# Patient Record
Sex: FEMALE | Race: WHITE | NOT HISPANIC OR LATINO | Employment: FULL TIME | URBAN - METROPOLITAN AREA
[De-identification: names, ages, dates, MRNs, and addresses within clinical notes are randomized per-mention and may not be internally consistent; named-entity substitution may affect disease eponyms.]

---

## 2017-08-16 ENCOUNTER — CONVERSION ENCOUNTER (OUTPATIENT)
Dept: GENERAL RADIOLOGY | Facility: HOSPITAL | Age: 47
End: 2017-08-16

## 2017-08-31 ENCOUNTER — CONVERSION ENCOUNTER (OUTPATIENT)
Dept: GENERAL RADIOLOGY | Facility: HOSPITAL | Age: 47
End: 2017-08-31

## 2018-03-30 ENCOUNTER — CONVERSION ENCOUNTER (OUTPATIENT)
Dept: GENERAL RADIOLOGY | Facility: HOSPITAL | Age: 48
End: 2018-03-30

## 2019-06-21 ENCOUNTER — HOSPITAL ENCOUNTER (OUTPATIENT)
Dept: GENERAL RADIOLOGY | Facility: HOSPITAL | Age: 49
Discharge: HOME | End: 2019-06-21
Attending: OBSTETRICS & GYNECOLOGY

## 2019-07-01 ENCOUNTER — OFFICE VISIT CONVERTED (OUTPATIENT)
Dept: SURGERY | Facility: CLINIC | Age: 49
End: 2019-07-01
Attending: NURSE PRACTITIONER

## 2019-07-01 ENCOUNTER — CONVERSION ENCOUNTER (OUTPATIENT)
Dept: SURGERY | Facility: CLINIC | Age: 49
End: 2019-07-01

## 2019-07-05 ENCOUNTER — HOSPITAL ENCOUNTER (OUTPATIENT)
Dept: GASTROENTEROLOGY | Facility: HOSPITAL | Age: 49
Setting detail: HOSPITAL OUTPATIENT SURGERY
Discharge: HOME | End: 2019-07-05
Attending: SURGERY

## 2019-07-05 LAB — GLUCOSE BLD-MCNC: 109 MG/DL (ref 65–99)

## 2019-07-22 ENCOUNTER — OFFICE VISIT CONVERTED (OUTPATIENT)
Dept: SURGERY | Facility: CLINIC | Age: 49
End: 2019-07-22
Attending: NURSE PRACTITIONER

## 2020-06-23 ENCOUNTER — HOSPITAL ENCOUNTER (OUTPATIENT)
Dept: GENERAL RADIOLOGY | Facility: HOSPITAL | Age: 50
Discharge: HOME | End: 2020-06-23
Attending: INTERNAL MEDICINE

## 2020-06-29 ENCOUNTER — OFFICE VISIT CONVERTED (OUTPATIENT)
Dept: SURGERY | Facility: CLINIC | Age: 50
End: 2020-06-29
Attending: SURGERY

## 2020-07-13 ENCOUNTER — HOSPITAL ENCOUNTER (OUTPATIENT)
Dept: PREADMISSION TESTING | Facility: HOSPITAL | Age: 50
Discharge: HOME | End: 2020-07-13
Attending: SURGERY

## 2020-07-14 LAB — SARS-COV-2 RNA SPEC QL NAA+PROBE: NOT DETECTED

## 2020-07-15 ENCOUNTER — HOSPITAL ENCOUNTER (OUTPATIENT)
Dept: PERIOP | Facility: HOSPITAL | Age: 50
Setting detail: HOSPITAL OUTPATIENT SURGERY
Discharge: HOME | End: 2020-07-15
Attending: SURGERY

## 2020-07-15 LAB
ANION GAP SERPL CALC-SCNC: 14 MMOL/L (ref 8–19)
BUN SERPL-MCNC: 15 MG/DL (ref 5–25)
BUN/CREAT SERPL: 13 {RATIO} (ref 6–20)
CALCIUM SERPL-MCNC: 9.8 MG/DL (ref 8.7–10.4)
CHLORIDE SERPL-SCNC: 102 MMOL/L (ref 99–111)
CONV CO2: 26 MMOL/L (ref 22–32)
CREAT UR-MCNC: 1.17 MG/DL (ref 0.5–0.9)
GFR SERPLBLD BASED ON 1.73 SQ M-ARVRAT: 54 ML/MIN/{1.73_M2}
GLUCOSE SERPL-MCNC: 107 MG/DL (ref 65–99)
OSMOLALITY SERPL CALC.SUM OF ELEC: 287 MOSM/KG (ref 273–304)
POTASSIUM SERPL-SCNC: 4.4 MMOL/L (ref 3.5–5.3)
SODIUM SERPL-SCNC: 138 MMOL/L (ref 135–147)

## 2020-07-20 ENCOUNTER — OFFICE VISIT CONVERTED (OUTPATIENT)
Dept: SURGERY | Facility: CLINIC | Age: 50
End: 2020-07-20
Attending: SURGERY

## 2020-07-31 ENCOUNTER — OFFICE VISIT CONVERTED (OUTPATIENT)
Dept: SURGERY | Facility: CLINIC | Age: 50
End: 2020-07-31
Attending: SURGERY

## 2020-08-05 ENCOUNTER — HOSPITAL ENCOUNTER (OUTPATIENT)
Dept: GENERAL RADIOLOGY | Facility: HOSPITAL | Age: 50
Discharge: HOME | End: 2020-08-05
Attending: INTERNAL MEDICINE

## 2020-08-21 ENCOUNTER — OFFICE VISIT CONVERTED (OUTPATIENT)
Dept: SURGERY | Facility: CLINIC | Age: 50
End: 2020-08-21
Attending: SURGERY

## 2020-12-31 ENCOUNTER — HOSPITAL ENCOUNTER (OUTPATIENT)
Dept: GENERAL RADIOLOGY | Facility: HOSPITAL | Age: 50
Discharge: HOME | End: 2020-12-31
Attending: NURSE PRACTITIONER

## 2021-05-10 NOTE — H&P
History and Physical      Patient Name: Janet Hooker   Patient ID: [id number]   Sex: Female   YOB: 1970    Primary Care Provider: Candice Lou MD   Referring Provider: Candice Lou MD    Visit Date: June 29, 2020    Provider: Aristeo Ramachandran MD   Location: [facility]   Location Address: [address] [place] [address]  [id number]   Location Phone: [phone]          Chief Complaint  · Outpatient History & Physical / Surgical Orders  · Gallbladder Consult      History Of Present Illness     Janet came in today for evaluation. She is a really nice lady that we have met in the past. She has been having some upper abdominal pain after meals. She recently had an ultrasound that showed some gallstones.       Past Medical History  Allergic rhinitis, chronic; Anemia, Unspecified; Diabetes; Endometriosis of uterus; High blood pressure; High cholesterol; Hypertension, essential; Hypothyroidism; Polycystic Ovaries; Rectal bleeding         Past Surgical History  Cesarian Section; Hysterectomy-Abdominal; Septoplasty; Turbinate Reduction         Medication List  Advil oral; apple cider vinegar 600 mg oral capsule; B-12 Compliance 1,000 mcg/mL injection kit; carvedilol oral; Cinnamon 500 mg oral capsule; Claritin oral; Daily Fiber 0.52 gram oral capsule; Flonase Allergy Relief 50 mcg/actuation nasal spray,suspension; metformin oral; spironolactone oral; Synthroid 100 mcg Oral Tablet; Tylenol oral; Vitamin D-3 with Aloe 120-1,000-10 mg-unit-mg oral tablet; vitamin E 400 unit oral capsule         Allergy List  Codeine Phosphate         Family Medical History  Diabetes, unspecified type; Renal Calculus         Social History  *Denies Alcohol Use; Alcohol (Never); Caffeine (Current some day); Second hand smoke exposure (Never); Tobacco (Never)         Review of Systems  · Constitutional  o Denies  o : fever  · Eyes  o Denies  o : yellowish discoloration of eyes  · HENT  o Denies  o : difficulty  "swallowing  · Cardiovascular  o Denies  o : chest pain on exertion  · Respiratory  o Denies  o : shortness of breath  · Gastrointestinal  o Denies  o : nausea, vomiting, diarrhea, constipation  · Integument  o Denies  o : rash  · Neurologic  o Denies  o : tingling or numbness  · Musculoskeletal  o Denies  o : joint pain  · Endocrine  o Denies  o : weight gain, weight loss      Vitals  Date Time BP Position Site L\R Cuff Size HR RR TEMP (F) WT  HT  BMI kg/m2 BSA m2 O2 Sat        06/29/2020 01:36 PM       16  197lbs 0oz 5'  3.5\" 34.35 2           Physical Examination  · Constitutional  o Appearance  o : well-nourished, well developed, alert, in no acute distress  · Head and Face  o Head  o :   § Inspection  § : atraumatic, normocephalic  · Neck  o Inspection/Palpation  o : supple, normal range of motion  · Respiratory  o Inspection of Chest  o : normal inspection  o Auscultation of Lungs  o : breath sounds normal, no distress, clear to ascultate bilaterally  · Cardiovascular  o Heart  o :   § Auscultation of Heart  § : regular rate and rhythm, no murmur, gallop or rub  · Gastrointestinal  o Abdominal Examination  o : normal bowel sounds, non-tender, soft          Assessment  · Pre-Surgical Orders     V72.84  · Calculus of bile duct without cholangitis or biliary obstruction     574.50/K80.50       Very nice lady who has symptomatic gallstones       Plan  · Orders  o Surgery Order (GEN[facility]) - 574.50/K80.50 - 07/15/2020  o [facility] Pre-Op Covid-19 Screening (33848) - 574.50/K80.50 - 07/13/2020   Scheduled at [facility] on 7/13/20 at 9:45am  · Medications  o Medications have been Reconciled  o Transition of Care or Provider Policy  · Instructions  o PLAN:  o Handouts Provided-Pre-Procedure Instructions including date and time and location of procedure.  o Surgical Facility: [facility]  o ****Surgical Orders****  o ****Patient Status****  o Outpatient  o RISK AND BENEFITS:  o Consent for surgery: Given these options, " the patient has verbally expressed an understanding of the risks of surgery and finds these risks acceptable. We will proceed with surgery as soon as possible.  o Consult Anesthesia for any post-operative block, or any pain management procedure deemed necessary by the anestesiologist for adequate post-operative pain control.   o O.R. PREP: Per protocol  o SCD's preoperatively  o PLEASE SIGN PERMIT FOR: Laparoscopic possible open cholecystectomy  o Indocyanine Green- 2.5MG IV- On Call To OR  o *___The above History and Physical Examination has been completed within 30 days of admission.  o Electronically Identified Patient Education Materials Provided Electronically     We are going to set her up for a laparoscopic cholecystectomy. I have described the procedure to her as well as the risks and benefits and she is agreeable to proceeding.             Electronically Signed by: Stephany Oswald-, -Author on June 30, 2020 08:38:32 AM  Electronically Co-signed by: Aristeo Ramachandran MD -Reviewer on June 30, 2020 01:59:09 PM

## 2021-05-13 NOTE — PROGRESS NOTES
Progress Note      Patient Name: Janet Hooker   Patient ID: [id number]   Sex: Female   YOB: 1970    Primary Care Provider: Candice Lou MD   Referring Provider: Candice Lou MD    Visit Date: August 21, 2020    Provider: Aristeo Ramachandran MD   Location: [facility]   Location Address: [address] [place] [address]  [id number]   Location Phone: [phone]          Chief Complaint  · Follow Up Office Visit      History Of Present Illness     Ms. Hooker came back for follow-up. She had begun to spit out a Vicryl suture from her epigastric incision.       Past Medical History  Allergic rhinitis, chronic; Anemia, Unspecified; Diabetes; Endometriosis of uterus; High blood pressure; High cholesterol; Hypertension, essential; Hypothyroidism; Polycystic Ovaries; Rectal bleeding         Past Surgical History  Cesarian Section; Cholecystectomy; Hysterectomy-Abdominal; Septoplasty; Turbinate Reduction         Medication List  Advil oral; apple cider vinegar 600 mg oral capsule; B-12 Compliance 1,000 mcg/mL injection kit; carvedilol oral; Cinnamon 500 mg oral capsule; Claritin oral; Daily Fiber 0.52 gram oral capsule; Flonase Allergy Relief 50 mcg/actuation nasal spray,suspension; metformin oral; spironolactone oral; Synthroid 100 mcg Oral Tablet; Tylenol oral; Vitamin D-3 with Aloe 120-1,000-10 mg-unit-mg oral tablet; vitamin E 400 unit oral capsule         Allergy List  Codeine Phosphate         Family Medical History  Diabetes, unspecified type; Renal Calculus         Social History  *Denies Alcohol Use; Alcohol (Never); Caffeine (Current some day); Second hand smoke exposure (Never); Tobacco (Never)         Review of Systems  · Cardiovascular  o Denies  o : chest pain, irregular heart beats, rapid heart rate, chest pain on exertion, shortness of breath, lower extremity swelling  · Respiratory  o Denies  o : shortness of breath, wheezing, cough, wheezing, chronic cough, coughing up  "blood  · Gastrointestinal  o Denies  o : nausea, vomiting, diarrhea, chronic abdominal pain, reflux symptoms      Vitals  Date Time BP Position Site L\R Cuff Size HR RR TEMP (F) WT  HT  BMI kg/m2 BSA m2 O2 Sat HC       08/21/2020 09:26 AM       14  198lbs 0oz 5'  3.5\" 34.52 2.01           Physical Examination     Today on physical exam, she does have a Vicryl suture extruding from that incision and we went ahead and removed it today.           Assessment  · Postoperative Exam Following Surgery     V67.00       Doing well status post recent laparoscopic cholecystectomy. We went ahead and removed some sutures from her incision.       Plan  · Medications  o Medications have been Reconciled  o Transition of Care or Provider Policy  · Instructions  o Follow up as needed.            Electronically Signed by: Stephany Oswald-, -Author on August 21, 2020 01:32:55 PM  Electronically Co-signed by: Aristeo Ramachandran MD -Reviewer on August 22, 2020 08:44:49 AM  "

## 2021-05-13 NOTE — PROGRESS NOTES
Progress Note      Patient Name: Janet Hooker   Patient ID: [id number]   Sex: Female   YOB: 1970    Primary Care Provider: Candice Lou MD   Referring Provider: Candice Lou MD    Visit Date: July 31, 2020    Provider: Aristeo Ramachandran MD   Location: [facility]   Location Address: [address] [place] [address]  [id number]   Location Phone: [phone]          Chief Complaint  · Follow Up Office Visit      History Of Present Illness     Janet came back for follow-up. She is doing fine following laparoscopic cholecystectomy. She feels okay.       Past Medical History  Allergic rhinitis, chronic; Anemia, Unspecified; Diabetes; Endometriosis of uterus; High blood pressure; High cholesterol; Hypertension, essential; Hypothyroidism; Polycystic Ovaries; Rectal bleeding         Past Surgical History  Cesarian Section; Cholecystectomy; Hysterectomy-Abdominal; Septoplasty; Turbinate Reduction         Medication List  Advil oral; apple cider vinegar 600 mg oral capsule; B-12 Compliance 1,000 mcg/mL injection kit; carvedilol oral; Cinnamon 500 mg oral capsule; Claritin oral; Daily Fiber 0.52 gram oral capsule; Flonase Allergy Relief 50 mcg/actuation nasal spray,suspension; metformin oral; spironolactone oral; Synthroid 100 mcg Oral Tablet; Tylenol oral; Vitamin D-3 with Aloe 120-1,000-10 mg-unit-mg oral tablet; vitamin E 400 unit oral capsule         Allergy List  Codeine Phosphate         Family Medical History  Diabetes, unspecified type; Renal Calculus         Social History  *Denies Alcohol Use; Alcohol (Never); Caffeine (Current some day); Second hand smoke exposure (Never); Tobacco (Never)         Review of Systems  · Cardiovascular  o Denies  o : chest pain, irregular heart beats, rapid heart rate, chest pain on exertion, shortness of breath, lower extremity swelling  · Respiratory  o Denies  o : shortness of breath, wheezing, cough, wheezing, chronic cough, coughing up  blood  · Gastrointestinal  o Denies  o : nausea, vomiting, diarrhea, chronic abdominal pain, reflux symptoms      Physical Examination     Today on physical exam, she looks good. Her incisions look fine.     Her pathology report came back consistent with gallstones and chronic cholecystitis.           Assessment  · Postoperative Exam Following Surgery     V67.00       Doing okay status post laparoscopic cholecystectomy.       Plan  · Medications  o Medications have been Reconciled  o Transition of Care or Provider Policy  · Instructions  o Follow up as needed.            Electronically Signed by: Stephany Oswald-, -Author on July 31, 2020 03:28:20 PM  Electronically Co-signed by: Aristeo Ramachandran MD -Reviewer on August 4, 2020 09:27:48 AM

## 2021-05-13 NOTE — PROGRESS NOTES
Progress Note      Patient Name: Janet Hooker   Patient ID: [id number]   Sex: Female   YOB: 1970    Primary Care Provider: Candice Lou MD   Referring Provider: Candice Lou MD    Visit Date: July 20, 2020    Provider: Aristeo Ramachandran MD   Location: [facility]   Location Address: [address] [place] [address]  [id number]   Location Phone: [phone]          Chief Complaint  · Follow Up Office Visit      History Of Present Illness     Janet came in today for evaluation. She had a laparoscopic cholecystectomy last week. She had some questions about what her epigastric incision looked like. She had a little bit of drainage from it earlier today.       Past Medical History  Allergic rhinitis, chronic; Anemia, Unspecified; Diabetes; Endometriosis of uterus; High blood pressure; High cholesterol; Hypertension, essential; Hypothyroidism; Polycystic Ovaries; Rectal bleeding         Past Surgical History  Cesarian Section; Cholecystectomy; Hysterectomy-Abdominal; Septoplasty; Turbinate Reduction         Medication List  Advil oral; apple cider vinegar 600 mg oral capsule; B-12 Compliance 1,000 mcg/mL injection kit; carvedilol oral; Cinnamon 500 mg oral capsule; Claritin oral; Daily Fiber 0.52 gram oral capsule; Flonase Allergy Relief 50 mcg/actuation nasal spray,suspension; metformin oral; spironolactone oral; Synthroid 100 mcg Oral Tablet; Tylenol oral; Vitamin D-3 with Aloe 120-1,000-10 mg-unit-mg oral tablet; vitamin E 400 unit oral capsule         Allergy List  Codeine Phosphate         Family Medical History  Diabetes, unspecified type; Renal Calculus         Social History  *Denies Alcohol Use; Alcohol (Never); Caffeine (Current some day); Second hand smoke exposure (Never); Tobacco (Never)         Review of Systems  · Cardiovascular  o Denies  o : chest pain, irregular heart beats, rapid heart rate, chest pain on exertion, shortness of breath, lower extremity  "swelling  · Respiratory  o Denies  o : shortness of breath, wheezing, cough, wheezing, chronic cough, coughing up blood  · Gastrointestinal  o Denies  o : nausea, vomiting, diarrhea, chronic abdominal pain, reflux symptoms      Vitals  Date Time BP Position Site L\R Cuff Size HR RR TEMP (F) WT  HT  BMI kg/m2 BSA m2 O2 Sat HC       07/20/2020 01:08 PM       16  197lbs 0oz 5'  3.5\" 34.35 2           Physical Examination     Today on physical exam, all of her incisions look okay. I don't think there is anything there that looks overtly infected.           Assessment  · Postoperative Exam Following Surgery     V67.00       Doing okay status post laparoscopic cholecystectomy.       Plan  · Medications  o Medications have been Reconciled  o Transition of Care or Provider Policy     I will see her at her scheduled follow-up next week.             Electronically Signed by: Stephany Oswald-, -Author on July 21, 2020 08:32:47 AM  Electronically Co-signed by: Aristeo Ramachandran MD -Reviewer on July 21, 2020 09:16:03 AM  "

## 2021-05-15 VITALS — HEIGHT: 63 IN | WEIGHT: 197 LBS | BODY MASS INDEX: 34.91 KG/M2 | RESPIRATION RATE: 16 BRPM

## 2021-05-15 VITALS — HEIGHT: 63 IN | RESPIRATION RATE: 16 BRPM | WEIGHT: 197 LBS | BODY MASS INDEX: 34.91 KG/M2

## 2021-05-15 VITALS — HEIGHT: 63 IN | BODY MASS INDEX: 35.08 KG/M2 | WEIGHT: 198 LBS | RESPIRATION RATE: 14 BRPM

## 2021-05-15 VITALS — BODY MASS INDEX: 35.15 KG/M2 | HEIGHT: 63 IN | WEIGHT: 198.37 LBS | RESPIRATION RATE: 12 BRPM

## 2021-05-15 VITALS — BODY MASS INDEX: 34.91 KG/M2 | RESPIRATION RATE: 16 BRPM | WEIGHT: 197 LBS | HEIGHT: 63 IN

## 2022-07-18 ENCOUNTER — TRANSCRIBE ORDERS (OUTPATIENT)
Dept: ADMINISTRATIVE | Facility: HOSPITAL | Age: 52
End: 2022-07-18

## 2022-07-18 DIAGNOSIS — Z12.31 SCREENING MAMMOGRAM, ENCOUNTER FOR: Primary | ICD-10-CM

## 2022-10-03 ENCOUNTER — HOSPITAL ENCOUNTER (OUTPATIENT)
Dept: MAMMOGRAPHY | Facility: HOSPITAL | Age: 52
Discharge: HOME | End: 2022-10-03
Admitting: FAMILY MEDICINE

## 2022-10-03 DIAGNOSIS — Z12.31 SCREENING MAMMOGRAM, ENCOUNTER FOR: ICD-10-CM

## 2022-10-03 PROCEDURE — 77063 BREAST TOMOSYNTHESIS BI: CPT

## 2022-10-03 PROCEDURE — 77067 SCR MAMMO BI INCL CAD: CPT

## 2023-02-13 ENCOUNTER — APPOINTMENT (OUTPATIENT)
Dept: GENERAL RADIOLOGY | Facility: HOSPITAL | Age: 53
End: 2023-02-13
Payer: COMMERCIAL

## 2023-02-13 ENCOUNTER — HOSPITAL ENCOUNTER (EMERGENCY)
Facility: HOSPITAL | Age: 53
Discharge: HOME | End: 2023-02-13
Attending: EMERGENCY MEDICINE | Admitting: EMERGENCY MEDICINE
Payer: COMMERCIAL

## 2023-02-13 VITALS
RESPIRATION RATE: 12 BRPM | BODY MASS INDEX: 34.55 KG/M2 | TEMPERATURE: 98.5 F | WEIGHT: 195 LBS | DIASTOLIC BLOOD PRESSURE: 78 MMHG | OXYGEN SATURATION: 97 % | SYSTOLIC BLOOD PRESSURE: 119 MMHG | HEIGHT: 63 IN | HEART RATE: 89 BPM

## 2023-02-13 DIAGNOSIS — I10 ACCELERATED HYPERTENSION: Primary | ICD-10-CM

## 2023-02-13 LAB
ALBUMIN SERPL-MCNC: 4.9 G/DL (ref 3.5–5.2)
ALBUMIN/GLOB SERPL: 1.6 G/DL
ALP SERPL-CCNC: 102 U/L (ref 39–117)
ALT SERPL W P-5'-P-CCNC: 40 U/L (ref 1–33)
ANION GAP SERPL CALCULATED.3IONS-SCNC: 11 MMOL/L (ref 5–15)
AST SERPL-CCNC: 39 U/L (ref 1–32)
BASOPHILS # BLD AUTO: 0.13 10*3/MM3 (ref 0–0.2)
BASOPHILS NFR BLD AUTO: 1.1 % (ref 0–1.5)
BILIRUB SERPL-MCNC: 0.4 MG/DL (ref 0–1.2)
BUN SERPL-MCNC: 19 MG/DL (ref 6–20)
BUN/CREAT SERPL: 17.9 (ref 7–25)
CALCIUM SPEC-SCNC: 9.7 MG/DL (ref 8.6–10.5)
CHLORIDE SERPL-SCNC: 101 MMOL/L (ref 98–107)
CO2 SERPL-SCNC: 23 MMOL/L (ref 22–29)
CREAT SERPL-MCNC: 1.06 MG/DL (ref 0.57–1)
DEPRECATED RDW RBC AUTO: 38.1 FL (ref 37–54)
EGFRCR SERPLBLD CKD-EPI 2021: 62.6 ML/MIN/1.73
EOSINOPHIL # BLD AUTO: 0.26 10*3/MM3 (ref 0–0.4)
EOSINOPHIL NFR BLD AUTO: 2.3 % (ref 0.3–6.2)
ERYTHROCYTE [DISTWIDTH] IN BLOOD BY AUTOMATED COUNT: 12.7 % (ref 12.3–15.4)
GEN 5 2HR TROPONIN T REFLEX: <6 NG/L
GLOBULIN UR ELPH-MCNC: 3 GM/DL
GLUCOSE SERPL-MCNC: 136 MG/DL (ref 65–99)
HCT VFR BLD AUTO: 43 % (ref 34–46.6)
HGB BLD-MCNC: 14.6 G/DL (ref 12–15.9)
HOLD SPECIMEN: NORMAL
HOLD SPECIMEN: NORMAL
IMM GRANULOCYTES # BLD AUTO: 0.03 10*3/MM3 (ref 0–0.05)
IMM GRANULOCYTES NFR BLD AUTO: 0.3 % (ref 0–0.5)
LYMPHOCYTES # BLD AUTO: 3.07 10*3/MM3 (ref 0.7–3.1)
LYMPHOCYTES NFR BLD AUTO: 27.1 % (ref 19.6–45.3)
MCH RBC QN AUTO: 28.1 PG (ref 26.6–33)
MCHC RBC AUTO-ENTMCNC: 34 G/DL (ref 31.5–35.7)
MCV RBC AUTO: 82.7 FL (ref 79–97)
MONOCYTES # BLD AUTO: 0.82 10*3/MM3 (ref 0.1–0.9)
MONOCYTES NFR BLD AUTO: 7.3 % (ref 5–12)
NEUTROPHILS NFR BLD AUTO: 61.9 % (ref 42.7–76)
NEUTROPHILS NFR BLD AUTO: 7 10*3/MM3 (ref 1.7–7)
NRBC BLD AUTO-RTO: 0 /100 WBC (ref 0–0.2)
PLATELET # BLD AUTO: 428 10*3/MM3 (ref 140–450)
PMV BLD AUTO: 9.7 FL (ref 6–12)
POTASSIUM SERPL-SCNC: 3.9 MMOL/L (ref 3.5–5.2)
PROT SERPL-MCNC: 7.9 G/DL (ref 6–8.5)
QT INTERVAL: 368 MS
RBC # BLD AUTO: 5.2 10*6/MM3 (ref 3.77–5.28)
SODIUM SERPL-SCNC: 135 MMOL/L (ref 136–145)
TROPONIN T DELTA: NORMAL
TROPONIN T SERPL HS-MCNC: <6 NG/L
WBC NRBC COR # BLD: 11.31 10*3/MM3 (ref 3.4–10.8)
WHOLE BLOOD HOLD COAG: NORMAL
WHOLE BLOOD HOLD SPECIMEN: NORMAL

## 2023-02-13 PROCEDURE — 84484 ASSAY OF TROPONIN QUANT: CPT

## 2023-02-13 PROCEDURE — 93010 ELECTROCARDIOGRAM REPORT: CPT | Performed by: INTERNAL MEDICINE

## 2023-02-13 PROCEDURE — 96374 THER/PROPH/DIAG INJ IV PUSH: CPT

## 2023-02-13 PROCEDURE — 36415 COLL VENOUS BLD VENIPUNCTURE: CPT

## 2023-02-13 PROCEDURE — 85025 COMPLETE CBC W/AUTO DIFF WBC: CPT

## 2023-02-13 PROCEDURE — 93005 ELECTROCARDIOGRAM TRACING: CPT | Performed by: EMERGENCY MEDICINE

## 2023-02-13 PROCEDURE — 93005 ELECTROCARDIOGRAM TRACING: CPT

## 2023-02-13 PROCEDURE — 99283 EMERGENCY DEPT VISIT LOW MDM: CPT

## 2023-02-13 PROCEDURE — 71046 X-RAY EXAM CHEST 2 VIEWS: CPT

## 2023-02-13 PROCEDURE — 80053 COMPREHEN METABOLIC PANEL: CPT

## 2023-02-13 RX ORDER — NIFEDIPINE 30 MG/1
60 TABLET, EXTENDED RELEASE ORAL DAILY
Qty: 30 TABLET | Refills: 0 | Status: SHIPPED | OUTPATIENT
Start: 2023-02-13 | End: 2023-03-15 | Stop reason: DRUGHIGH

## 2023-02-13 RX ORDER — LORATADINE 10 MG/1
10 TABLET ORAL DAILY
COMMUNITY

## 2023-02-13 RX ORDER — AMOXICILLIN 875 MG/1
875 TABLET, COATED ORAL 2 TIMES DAILY
COMMUNITY

## 2023-02-13 RX ORDER — LEVOTHYROXINE SODIUM 112 UG/1
112 TABLET ORAL
COMMUNITY

## 2023-02-13 RX ORDER — CLONIDINE HYDROCHLORIDE 0.1 MG/1
0.1 TABLET ORAL ONCE
Status: DISCONTINUED | OUTPATIENT
Start: 2023-02-13 | End: 2023-02-14 | Stop reason: HOSPADM

## 2023-02-13 RX ORDER — METRONIDAZOLE 500 MG/1
500 TABLET ORAL 2 TIMES DAILY
COMMUNITY
End: 2023-02-16

## 2023-02-13 RX ORDER — METOPROLOL TARTRATE 50 MG/1
50 TABLET, FILM COATED ORAL 2 TIMES DAILY
Qty: 60 TABLET | Refills: 0 | Status: SHIPPED | OUTPATIENT
Start: 2023-02-13

## 2023-02-13 RX ORDER — SERTRALINE HYDROCHLORIDE 25 MG/1
25 TABLET, FILM COATED ORAL DAILY
COMMUNITY

## 2023-02-13 RX ORDER — CARVEDILOL 25 MG/1
25 TABLET ORAL 2 TIMES DAILY WITH MEALS
COMMUNITY
End: 2023-02-13

## 2023-02-13 RX ORDER — LORAZEPAM 0.5 MG/1
0.5 TABLET ORAL EVERY 6 HOURS PRN
COMMUNITY

## 2023-02-13 RX ORDER — NIFEDIPINE 30 MG/1
30 TABLET, EXTENDED RELEASE ORAL DAILY
COMMUNITY
End: 2023-02-13 | Stop reason: SDUPTHER

## 2023-02-13 RX ORDER — SODIUM CHLORIDE 0.9 % (FLUSH) 0.9 %
10 SYRINGE (ML) INJECTION AS NEEDED
Status: DISCONTINUED | OUTPATIENT
Start: 2023-02-13 | End: 2023-02-14 | Stop reason: HOSPADM

## 2023-02-13 RX ORDER — LOSARTAN POTASSIUM 50 MG/1
50 TABLET ORAL DAILY
COMMUNITY

## 2023-02-13 RX ADMIN — METOPROLOL TARTRATE 5 MG: 1 INJECTION, SOLUTION INTRAVENOUS at 19:40

## 2023-02-13 NOTE — ED TRIAGE NOTES
Pt had bp 200/126.  She is on bp meds.  She has chest tightness.  She is weak and is fatigued.  She is queasy    Patient was placed in face mask during first look triage.  Patient was wearing a face mask throughout encounter.  I wore personal protective equipment throughout the encounter.  Hand hygiene was performed before and after patient encounter.

## 2023-02-13 NOTE — ED PROVIDER NOTES
" EMERGENCY DEPARTMENT ENCOUNTER    Room Number:  36/36  Date seen:  2/14/2023  PCP: Ronny Baig DO  Historian: Patient  Relevant information provided by sources other than the patient, review of external records, and social determinants of health may be included in the HPI section.      HPI:  Chief Complaint: High blood pressure  Additional historical features obtained directly from: No  Context: Janet Huff is a 52 y.o. female who presents to the ED c/o high blood pressure which has been worsening over the last week.  Patient reports she has been seen twice in the last couple weeks for high blood pressure at [facility] emergency department.  She was even admitted for couple days and said that they did some tests on her and started on some blood pressure medicine but it does not appear to be helping.  Patient said that this morning when she woke up her blood pressure was \"normal\", she took her medicines, and then laid on the couch all day resting.  She said that she was checking her blood pressure every few hours, and as the day went along it kept getting higher and higher and just prior to arrival she started having some heaviness on the chest which prompted her to come to the ED.        Initial impression including social determinants which will impact the assessment     Social determinants limiting care are that I am not able to get the medical records from [facility] from the last few visits, and the medical records department is not open at this time.          PAST MEDICAL HISTORY  Active Ambulatory Problems     Diagnosis Date Noted   • No Active Ambulatory Problems     Resolved Ambulatory Problems     Diagnosis Date Noted   • No Resolved Ambulatory Problems     No Additional Past Medical History         PAST SURGICAL HISTORY  History reviewed. No pertinent surgical history.      FAMILY HISTORY  History reviewed. No pertinent family history.      SOCIAL HISTORY  Social History "     Socioeconomic History   • Marital status:          ALLERGIES  Amlodipine and Codeine          PHYSICAL EXAM  ED Triage Vitals   Temp Heart Rate Resp BP SpO2   02/13/23 1818 02/13/23 1818 02/13/23 1818 02/13/23 1826 02/13/23 1818   97.3 °F (36.3 °C) 101 16 (!) 197/129 97 %      Temp src Heart Rate Source Patient Position BP Location FiO2 (%)   02/13/23 1818 02/13/23 1818 -- -- --   Tympanic Monitor            Physical Exam      GENERAL: no acute distress  HENT: nares patent  EYES: no scleral icterus, PERRL, EOMI  CV: regular rhythm, normal rate, distal pulses symmetric and palpable  RESPIRATORY: normal effort  ABDOMEN: soft, nondistended nontender with normal bowel sound  MUSCULOSKELETAL: no deformity  NEURO: alert, moves all extremities, follows commands  PSYCH:  calm, cooperative  SKIN: warm, dry with no rash        LAB RESULTS  Recent Results (from the past 24 hour(s))   ECG 12 Lead Chest Pain    Collection Time: 02/13/23  6:25 PM   Result Value Ref Range    QT Interval 368 ms   Comprehensive Metabolic Panel    Collection Time: 02/13/23  6:35 PM    Specimen: Blood   Result Value Ref Range    Glucose 136 (H) 65 - 99 mg/dL    BUN 19 6 - 20 mg/dL    Creatinine 1.06 (H) 0.57 - 1.00 mg/dL    Sodium 135 (L) 136 - 145 mmol/L    Potassium 3.9 3.5 - 5.2 mmol/L    Chloride 101 98 - 107 mmol/L    CO2 23.0 22.0 - 29.0 mmol/L    Calcium 9.7 8.6 - 10.5 mg/dL    Total Protein 7.9 6.0 - 8.5 g/dL    Albumin 4.9 3.5 - 5.2 g/dL    ALT (SGPT) 40 (H) 1 - 33 U/L    AST (SGOT) 39 (H) 1 - 32 U/L    Alkaline Phosphatase 102 39 - 117 U/L    Total Bilirubin 0.4 0.0 - 1.2 mg/dL    Globulin 3.0 gm/dL    A/G Ratio 1.6 g/dL    BUN/Creatinine Ratio 17.9 7.0 - 25.0    Anion Gap 11.0 5.0 - 15.0 mmol/L    eGFR 62.6 >60.0 mL/min/1.73   High Sensitivity Troponin T    Collection Time: 02/13/23  6:35 PM    Specimen: Blood   Result Value Ref Range    HS Troponin T <6 <10 ng/L   Green Top (Gel)    Collection Time: 02/13/23  6:35 PM   Result  Value Ref Range    Extra Tube Hold for add-ons.    Lavender Top    Collection Time: 02/13/23  6:35 PM   Result Value Ref Range    Extra Tube hold for add-on    Gold Top - SST    Collection Time: 02/13/23  6:35 PM   Result Value Ref Range    Extra Tube Hold for add-ons.    Light Blue Top    Collection Time: 02/13/23  6:35 PM   Result Value Ref Range    Extra Tube Hold for add-ons.    CBC Auto Differential    Collection Time: 02/13/23  6:35 PM    Specimen: Blood   Result Value Ref Range    WBC 11.31 (H) 3.40 - 10.80 10*3/mm3    RBC 5.20 3.77 - 5.28 10*6/mm3    Hemoglobin 14.6 12.0 - 15.9 g/dL    Hematocrit 43.0 34.0 - 46.6 %    MCV 82.7 79.0 - 97.0 fL    MCH 28.1 26.6 - 33.0 pg    MCHC 34.0 31.5 - 35.7 g/dL    RDW 12.7 12.3 - 15.4 %    RDW-SD 38.1 37.0 - 54.0 fl    MPV 9.7 6.0 - 12.0 fL    Platelets 428 140 - 450 10*3/mm3    Neutrophil % 61.9 42.7 - 76.0 %    Lymphocyte % 27.1 19.6 - 45.3 %    Monocyte % 7.3 5.0 - 12.0 %    Eosinophil % 2.3 0.3 - 6.2 %    Basophil % 1.1 0.0 - 1.5 %    Immature Grans % 0.3 0.0 - 0.5 %    Neutrophils, Absolute 7.00 1.70 - 7.00 10*3/mm3    Lymphocytes, Absolute 3.07 0.70 - 3.10 10*3/mm3    Monocytes, Absolute 0.82 0.10 - 0.90 10*3/mm3    Eosinophils, Absolute 0.26 0.00 - 0.40 10*3/mm3    Basophils, Absolute 0.13 0.00 - 0.20 10*3/mm3    Immature Grans, Absolute 0.03 0.00 - 0.05 10*3/mm3    nRBC 0.0 0.0 - 0.2 /100 WBC   High Sensitivity Troponin T 2Hr    Collection Time: 02/13/23  8:40 PM    Specimen: Blood   Result Value Ref Range    HS Troponin T <6 <10 ng/L    Troponin T Delta         Ordered the above labs and independently interpreted results. My findings will be discussed in the medical decision making section below        RADIOLOGY  XR Chest 2 View    Result Date: 2/13/2023  XR CHEST 2 VW-  HISTORY:  Chest pain, hypertension.  COMPARISON:  None  FINDINGS:  2 views of the chest were obtained.  Support Devices:  None. Cardiac Silhouette/Mediastinum/Hue:  The cardiac, mediastinal, and  hilar contours are within normal limits. Lungs/Pleural Spaces:  There is a calcified granuloma in the right lung. There is no focal consolidation. Pleural spaces are clear. Chest Wall/Diaphragm/Upper Abdomen:  There is multilevel degenerative disc disease. There are surgical clips in the right upper quadrant.   CONCLUSION(S):   1.  No focal consolidation or effusion.  This report was finalized on 2/13/2023 7:17 PM by Dr. Anh Manriquez M.D.        Ordered the above noted radiological studies.  Independently interpreted by me in PACS.  My findings will be discussed in the medical decision making section below        PROCEDURES  Procedures          MEDICATIONS GIVEN IN ER  Medications   sodium chloride 0.9 % flush 10 mL (has no administration in time range)   cloNIDine (CATAPRES) tablet 0.1 mg (0 mg Oral Hold 2/13/23 1941)   metoprolol tartrate (LOPRESSOR) injection 5 mg (5 mg Intravenous Given 2/13/23 1940)                   MEDICAL DECISION MAKING, PROGRESS, and CONSULTS    Please note that this section constitutes my independent interpretation of clinical data including lab results, radiology, EKG's.  This constitutes my independent professional opinion regarding differential diagnosis and management of this patient.  It may include any factors such as history from outside sources, review of external records, social determinants of health, management of medications, response to those treatments, and discussions with other providers.      ED Course as of 02/14/23 0013 Tue Feb 14, 2023 0009 CBC and chemistry are fairly unremarkable except for some mild elevation of LFTs and we have none available for comparison [DP]   0009 High-sensitivity troponins negative x2, and along with no ACS on the EKG, I think this is most likely all related to hypertension and she would not require admission to the hospital for ischemic evaluation [DP]   0009 She did receive 1 dose of IV Lopressor 5 mg, and her blood pressure was down  "to 119/78 with a heart rate of 87 at the time I went back to discuss the results.  Patient said she still had a little dull occipital headache but otherwise felt about the same [DP]   0010 I had a lengthy conversation with the patient and family about the appropriate way to keep a blood pressure log by checking it twice a day at approximately the same time every day after her blood pressure meds to get the most accurate and helpful log. [DP]   0010 Patient said she has a follow-up appointment scheduled with \"one of the cardiologist.\"  For Thursday and she plans to make that [DP]   0010 I am going to make some minor adjustments in her blood pressure meds by changing the carvedilol to metoprolol because it worked so well on her tonight.  I will also increase her nifedipine.  I do think that some of this is based on preoccupation with the blood pressure and checking it over and over despite having no symptoms.  I have discouraged them from doing that as its not particularly helpful unless she is having significant symptoms that might suggest heart attack or stroke or other.  I talk with him at length about the [DP]   0012 EKG at 2104  Normal sinus rhythm  Normal HI, QRS and QT, there is perhaps some LVH  There is some nonspecific ST changes through the inferolateral leads, however they are not diagnostic of acute ischemia, and along with 2 negative troponins I think unlikely to represent ischemic heart disease [DP]   0012 Patient has a heart score of 3, but bear in mind that she had no chest discomfort until her blood pressure had become quite elevated.  She is not having any exertional chest pain.  I think that her blood pressure is probably been elevated for quite some time in the past and she just was not aware.  I talk with them about the most appropriate way is for slow adjustment to normal over time and reassured them the following up with cardiologist Thursday is appropriate [DP]      ED Course User Index  [DP] " Renzo Arreola MD                All appropriate hygiene and PPE requirements were satisfied with this patient encounter      FINAL DIAGNOSES  Final diagnoses:   Accelerated hypertension           DISPOSITION  Discharge          Latest Documented Vital Signs:  As of 00:13 EST  BP- 119/78 HR- 89 Temp- 98.5 °F (36.9 °C) (Oral) O2 sat- 97%        --    Please note that portions of this were completed with a voice recognition program.       Note Disclaimer: At [facility], we believe that sharing information builds trust and better relationships. You are receiving this note because you are receiving care at [facility] or recently visited. It is possible you will see health information before a provider has talked with you about it. This kind of information can be easy to misunderstand. To help you fully understand what it      Renzo Arreola MD  02/14/23 0013

## 2023-02-14 NOTE — DISCHARGE INSTRUCTIONS
An adequate blood pressure log is checking it twice a day at approximately the same time every day about an hour or 2 after you take your medications.  This will give you the most accurate representation of your blood pressure.  There is no need to check it more throughout the day unless you are having symptoms which suggest critical blood pressure such as severe headache, chest pain, shortness of breath, dizziness or other strokelike symptoms.  Also keep your follow-up appointment with the cardiologist as scheduled later this week

## 2023-02-16 ENCOUNTER — OFFICE VISIT (OUTPATIENT)
Dept: CARDIOLOGY | Facility: CLINIC | Age: 53
End: 2023-02-16
Payer: COMMERCIAL

## 2023-02-16 VITALS
HEIGHT: 63 IN | BODY MASS INDEX: 33.13 KG/M2 | DIASTOLIC BLOOD PRESSURE: 120 MMHG | WEIGHT: 187 LBS | SYSTOLIC BLOOD PRESSURE: 180 MMHG | HEART RATE: 90 BPM | OXYGEN SATURATION: 98 %

## 2023-02-16 DIAGNOSIS — I10 ESSENTIAL HYPERTENSION: Primary | ICD-10-CM

## 2023-02-16 PROCEDURE — 93000 ELECTROCARDIOGRAM COMPLETE: CPT | Performed by: INTERNAL MEDICINE

## 2023-02-16 PROCEDURE — 99204 OFFICE O/P NEW MOD 45 MIN: CPT | Performed by: INTERNAL MEDICINE

## 2023-02-16 RX ORDER — ERGOCALCIFEROL 1.25 MG/1
50000 CAPSULE ORAL WEEKLY
COMMUNITY

## 2023-02-16 NOTE — PROGRESS NOTES
Janet Huff  1970  Date of Office Visit: 02/16/23  Encounter Provider: Navjot Gaffney MD  Place of Service: [facility] CARDIOLOGY      CHIEF COMPLAINT:  Essential hypertension: Poorly controlled   Palpitation      HISTORY OF PRESENT ILLNESS:   Very pleasant 52-year-old female with a medical history of essential hypertension, obstructive sleep apnea, hypothyroidism and anxiety disorder who presents to me secondary to difficult to control blood pressure.  She states she has had a diagnosis of essential hypertension for at least 15 years.  She reports that up until recently she had been on carvedilol and had good control of her blood pressure.  As of late she denied any recent dietary changes or increasing stressors, however has had worsening blood pressure control.  She states that her systolic has been intermittently above 180 mmHg and this is associated with chest fullness and palpitations.  She has not had a recent cardiac work-up.  Denies any lower extremity edema associated with amlodipine therapy    Review of Systems   Constitutional: Negative for fever and malaise/fatigue.   HENT: Negative for nosebleeds and sore throat.    Eyes: Negative for blurred vision and double vision.   Cardiovascular: Negative for chest pain, claudication, palpitations and syncope.   Respiratory: Negative for cough, shortness of breath and snoring.    Endocrine: Negative for cold intolerance, heat intolerance and polydipsia.   Skin: Negative for itching, poor wound healing and rash.   Musculoskeletal: Negative for joint pain, joint swelling, muscle weakness and myalgias.   Gastrointestinal: Negative for abdominal pain, melena, nausea and vomiting.   Neurological: Negative for light-headedness, loss of balance, seizures, vertigo and weakness.   Psychiatric/Behavioral: Negative for altered mental status and depression.          Past Medical History:   Diagnosis Date   • Anxiety    •  "Chronic kidney disease (CKD), active medical management without dialysis, stage 2 (mild)    • Diabetes type 2, controlled (HCC)    • Hyperlipidemia    • Hypertension    • Hypothyroidism    • Obstructive sleep apnea        The following portions of the patient's history were reviewed and updated as appropriate: Social history , Family history and Surgical history     Current Outpatient Medications on File Prior to Visit   Medication Sig Dispense Refill   • amoxicillin (AMOXIL) 875 MG tablet Take 875 mg by mouth 2 (Two) Times a Day.     • levothyroxine (SYNTHROID, LEVOTHROID) 112 MCG tablet Take 112 mcg by mouth Every Morning.     • loratadine (CLARITIN) 10 MG tablet Take 10 mg by mouth Daily.     • LORazepam (ATIVAN) 0.5 MG tablet Take 0.5 mg by mouth Every 6 (Six) Hours As Needed for Anxiety.     • losartan (COZAAR) 50 MG tablet Take 50 mg by mouth Daily.     • metFORMIN (GLUCOPHAGE) 500 MG tablet Take 500 mg by mouth 2 (Two) Times a Day With Meals.     • metoprolol tartrate (LOPRESSOR) 50 MG tablet Take 1 tablet by mouth 2 (Two) Times a Day. 60 tablet 0   • NIFEdipine XL (PROCARDIA XL) 30 MG 24 hr tablet Take 2 tablets by mouth Daily. 30 tablet 0   • sertraline (ZOLOFT) 25 MG tablet Take 25 mg by mouth Daily.     • vitamin D (ERGOCALCIFEROL) 1.25 MG (71527 UT) capsule capsule Take 50,000 Units by mouth 1 (One) Time Per Week.     • [DISCONTINUED] metroNIDAZOLE (FLAGYL) 500 MG tablet Take 500 mg by mouth 2 (Two) Times a Day.       No current facility-administered medications on file prior to visit.       Allergies   Allergen Reactions   • Alpha-Gal Anaphylaxis   • Amlodipine Headache   • Codeine Other (See Comments)     Turns red and swells   • Pravastatin Myalgia       Vitals:    02/16/23 1419   BP: (!) 180/120   BP Location: Left arm   Patient Position: Sitting   Cuff Size: Adult   Pulse: 90   SpO2: 98%   Weight: 84.8 kg (187 lb)   Height: 160 cm (63\")     Body mass index is 33.13 kg/m².   Constitutional:       " Appearance: Well-developed.   Eyes:      General: No scleral icterus.     Conjunctiva/sclera: Conjunctivae normal.   HENT:      Head: Normocephalic and atraumatic.   Neck:      Thyroid: No thyromegaly.      Vascular: Normal carotid pulses. No carotid bruit, hepatojugular reflux or JVD.      Trachea: No tracheal deviation.   Pulmonary:      Effort: No respiratory distress.      Breath sounds: Normal breath sounds. No decreased breath sounds. No wheezing. No rhonchi. No rales.   Chest:      Chest wall: Not tender to palpatation.   Cardiovascular:      Normal rate. Regular rhythm.      No gallop.   Pulses:     Carotid: 2+ bilaterally.     Radial: 2+ bilaterally.     Femoral: 2+ bilaterally.     Dorsalis pedis: 2+ bilaterally.     Posterior tibial: 2+ bilaterally.  Edema:     Peripheral edema absent.   Abdominal:      General: Bowel sounds are normal. There is no distension.      Palpations: Abdomen is soft.      Tenderness: There is no abdominal tenderness.   Musculoskeletal:         General: No deformity.      Cervical back: Normal range of motion and neck supple. Skin:     Findings: No erythema or rash.   Neurological:      Mental Status: Alert and oriented to person, place, and time.      Sensory: No sensory deficit.   Psychiatric:         Behavior: Behavior normal.         Lab Results   Component Value Date    WBC 11.31 (H) 02/13/2023    HGB 14.6 02/13/2023    HCT 43.0 02/13/2023    MCV 82.7 02/13/2023     02/13/2023       Lab Results   Component Value Date    GLUCOSE 136 (H) 02/13/2023    BUN 19 02/13/2023    CREATININE 1.06 (H) 02/13/2023    EGFR 62.6 02/13/2023    BCR 17.9 02/13/2023    K 3.9 02/13/2023    CO2 23.0 02/13/2023    CALCIUM 9.7 02/13/2023    ALBUMIN 4.9 02/13/2023    AST 39 (H) 02/13/2023    ALT 40 (H) 02/13/2023       Lab Results   Component Value Date    GLUCOSE 136 (H) 02/13/2023    CALCIUM 9.7 02/13/2023     (L) 02/13/2023    K 3.9 02/13/2023    CO2 23.0 02/13/2023      02/13/2023    BUN 19 02/13/2023    CREATININE 1.06 (H) 02/13/2023    EGFR 62.6 02/13/2023    BCR 17.9 02/13/2023    ANIONGAP 11.0 02/13/2023       No results found for: CHOL, CHLPL, TRIG, HDL, LDL, LDLDIRECT      ECG 12 Lead    Date/Time: 2/16/2023 3:11 PM  Performed by: Navjot Gaffney MD  Authorized by: Navjot Gaffney MD   Comparison: compared with previous ECG from 2/13/2023  Similar to previous ECG  Rhythm: sinus rhythm  Rate: normal  QRS axis: normal    Clinical impression: non-specific ECG  Comments: Nonspecific T wave abnormalities anteriorly                 DISCUSSION/SUMMARY  52-year-old female with a medical history of obesity, obstructive sleep apnea, hypothyroidism, and essential hypertension who presents to me secondary to difficulty control blood pressure.  I have reviewed her medical regimen and at this point in time I think that the main issue is is that she is on 3 different antihypertensive therapies and not one of them is maxed out.  My initial approach would be to move her back off of the metoprolol tartrate on the carvedilol to obtain additional alpha blockade on top of the beta-blockade.  We will start at 12.5 mg p.o. every 12 hours and increase from there.  I think the neck step would be to get her on a diuretic less antihypertensive and I would do that in place of nifedipine in the future.    -And then have her come back in 1 week to 2 weeks for a transthoracic echocardiogram and repeat blood pressure check.  At that point in time we did maximize her carvedilol therapy.  This would be a 25 mg p.o. every 12 hours    -Assuming that blood pressure is not controlled on that regimen I will move her off of the nifedipine therapy and start 50 mg of chlorthalidone with follow-up lab work in 2 weeks to ensure that she does not have issues with hyponatremia    I do not think we need to work-up for secondary causes of her hypertension at this point in time.  No ischemic work-up indicated  currently.

## 2023-02-22 ENCOUNTER — TELEPHONE (OUTPATIENT)
Dept: CARDIOLOGY | Facility: CLINIC | Age: 53
End: 2023-02-22
Payer: COMMERCIAL

## 2023-03-10 ENCOUNTER — CLINICAL SUPPORT (OUTPATIENT)
Dept: CARDIOLOGY | Facility: CLINIC | Age: 53
End: 2023-03-10
Payer: COMMERCIAL

## 2023-03-10 ENCOUNTER — HOSPITAL ENCOUNTER (OUTPATIENT)
Dept: CARDIOLOGY | Facility: HOSPITAL | Age: 53
Discharge: HOME | End: 2023-03-10
Admitting: INTERNAL MEDICINE
Payer: COMMERCIAL

## 2023-03-10 VITALS
DIASTOLIC BLOOD PRESSURE: 86 MMHG | WEIGHT: 187 LBS | HEIGHT: 63 IN | SYSTOLIC BLOOD PRESSURE: 130 MMHG | BODY MASS INDEX: 33.13 KG/M2 | HEART RATE: 86 BPM

## 2023-03-10 DIAGNOSIS — I10 ESSENTIAL HYPERTENSION: ICD-10-CM

## 2023-03-10 LAB
AORTIC ARCH: 2.6 CM
ASCENDING AORTA: 3 CM
BH CV ECHO MEAS - ACS: 1.85 CM
BH CV ECHO MEAS - AO MAX PG: 11.5 MMHG
BH CV ECHO MEAS - AO MEAN PG: 6.2 MMHG
BH CV ECHO MEAS - AO ROOT DIAM: 2.9 CM
BH CV ECHO MEAS - AO V2 MAX: 169.7 CM/SEC
BH CV ECHO MEAS - AO V2 VTI: 31.6 CM
BH CV ECHO MEAS - AVA(I,D): 1.83 CM2
BH CV ECHO MEAS - EDV(CUBED): 62.3 ML
BH CV ECHO MEAS - EDV(MOD-SP2): 56 ML
BH CV ECHO MEAS - EDV(MOD-SP4): 60 ML
BH CV ECHO MEAS - EF(MOD-BP): 67.1 %
BH CV ECHO MEAS - EF(MOD-SP2): 67.9 %
BH CV ECHO MEAS - EF(MOD-SP4): 66.7 %
BH CV ECHO MEAS - ESV(CUBED): 12.3 ML
BH CV ECHO MEAS - ESV(MOD-SP2): 18 ML
BH CV ECHO MEAS - ESV(MOD-SP4): 20 ML
BH CV ECHO MEAS - FS: 41.7 %
BH CV ECHO MEAS - IVS/LVPW: 1.04 CM
BH CV ECHO MEAS - IVSD: 0.88 CM
BH CV ECHO MEAS - LAT PEAK E' VEL: 10.9 CM/SEC
BH CV ECHO MEAS - LV DIASTOLIC VOL/BSA (35-75): 31.9 CM2
BH CV ECHO MEAS - LV MASS(C)D: 102.3 GRAMS
BH CV ECHO MEAS - LV MAX PG: 6 MMHG
BH CV ECHO MEAS - LV MEAN PG: 3.4 MMHG
BH CV ECHO MEAS - LV SYSTOLIC VOL/BSA (12-30): 10.6 CM2
BH CV ECHO MEAS - LV V1 MAX: 122.7 CM/SEC
BH CV ECHO MEAS - LV V1 VTI: 23 CM
BH CV ECHO MEAS - LVIDD: 4 CM
BH CV ECHO MEAS - LVIDS: 2.31 CM
BH CV ECHO MEAS - LVOT AREA: 2.5 CM2
BH CV ECHO MEAS - LVOT DIAM: 1.79 CM
BH CV ECHO MEAS - LVPWD: 0.85 CM
BH CV ECHO MEAS - MED PEAK E' VEL: 9 CM/SEC
BH CV ECHO MEAS - MR MAX PG: 75.5 MMHG
BH CV ECHO MEAS - MR MAX VEL: 434.4 CM/SEC
BH CV ECHO MEAS - MV A DUR: 0.13 SEC
BH CV ECHO MEAS - MV A MAX VEL: 92.5 CM/SEC
BH CV ECHO MEAS - MV DEC SLOPE: 424.1 CM/SEC2
BH CV ECHO MEAS - MV DEC TIME: 0.18 MSEC
BH CV ECHO MEAS - MV E MAX VEL: 97.3 CM/SEC
BH CV ECHO MEAS - MV E/A: 1.05
BH CV ECHO MEAS - MV MAX PG: 3.9 MMHG
BH CV ECHO MEAS - MV MEAN PG: 2.07 MMHG
BH CV ECHO MEAS - MV P1/2T: 62.8 MSEC
BH CV ECHO MEAS - MV V2 VTI: 22.8 CM
BH CV ECHO MEAS - MVA(P1/2T): 3.5 CM2
BH CV ECHO MEAS - MVA(VTI): 2.5 CM2
BH CV ECHO MEAS - PA ACC TIME: 0.13 SEC
BH CV ECHO MEAS - PA PR(ACCEL): 19.6 MMHG
BH CV ECHO MEAS - PA V2 MAX: 105.9 CM/SEC
BH CV ECHO MEAS - PULM A REVS DUR: 0.12 SEC
BH CV ECHO MEAS - PULM A REVS VEL: 43.3 CM/SEC
BH CV ECHO MEAS - PULM DIAS VEL: 47.6 CM/SEC
BH CV ECHO MEAS - PULM S/D: 1.2
BH CV ECHO MEAS - PULM SYS VEL: 57 CM/SEC
BH CV ECHO MEAS - QP/QS: 0.74
BH CV ECHO MEAS - RAP SYSTOLE: 3 MMHG
BH CV ECHO MEAS - RV MAX PG: 2.02 MMHG
BH CV ECHO MEAS - RV V1 MAX: 71.1 CM/SEC
BH CV ECHO MEAS - RV V1 VTI: 13.9 CM
BH CV ECHO MEAS - RVOT DIAM: 1.98 CM
BH CV ECHO MEAS - RVSP: 16.2 MMHG
BH CV ECHO MEAS - SI(MOD-SP2): 20.2 ML/M2
BH CV ECHO MEAS - SI(MOD-SP4): 21.3 ML/M2
BH CV ECHO MEAS - SUP REN AO DIAM: 2.3 CM
BH CV ECHO MEAS - SV(LVOT): 57.7 ML
BH CV ECHO MEAS - SV(MOD-SP2): 38 ML
BH CV ECHO MEAS - SV(MOD-SP4): 40 ML
BH CV ECHO MEAS - SV(RVOT): 42.7 ML
BH CV ECHO MEAS - TAPSE (>1.6): 2.07 CM
BH CV ECHO MEAS - TR MAX PG: 13.2 MMHG
BH CV ECHO MEAS - TR MAX VEL: 181.9 CM/SEC
BH CV ECHO MEASUREMENTS AVERAGE E/E' RATIO: 9.78
BH CV XLRA - RV BASE: 1.86 CM
BH CV XLRA - RV LENGTH: 6.9 CM
BH CV XLRA - RV MID: 1.95 CM
BH CV XLRA - TDI S': 15.2 CM/SEC
LEFT ATRIUM VOLUME INDEX: 15.7 ML/M2
MAXIMAL PREDICTED HEART RATE: 166 BPM
SINUS: 2.5 CM
STJ: 2.8 CM
STRESS TARGET HR: 141 BPM

## 2023-03-10 PROCEDURE — 93306 TTE W/DOPPLER COMPLETE: CPT | Performed by: INTERNAL MEDICINE

## 2023-03-10 PROCEDURE — 93306 TTE W/DOPPLER COMPLETE: CPT

## 2023-03-10 NOTE — PROGRESS NOTES
Patient here for BP follow up  Patient is on carvedilol 12.5mg 1 po bid  Losartan potassium 50mg once nayely;y  Procardia xl 30mg 2 tablets daily      BP in office 130/86 p 102  Patient having headaches and is fatigued    [phone]

## 2023-03-13 ENCOUNTER — TELEPHONE (OUTPATIENT)
Dept: CARDIOLOGY | Facility: CLINIC | Age: 53
End: 2023-03-13
Payer: COMMERCIAL

## 2023-03-13 NOTE — TELEPHONE ENCOUNTER
3/3/23  (AM) 116/71 HR 66   (PM) 107/70 79HR  3/4/23   104/63 69 HR  3/5/23  104/69  82 HR  3/6/23  111/63 74 HR  3/8/23  (AM) 101/68 80 HR   (PM)121/70  82 HR  3/10/23  130/86

## 2023-03-13 NOTE — TELEPHONE ENCOUNTER
----- Message from Navjot Gaffney MD sent at 3/13/2023 10:36 AM EDT -----  Echo looks good.  I think she was presenting in for a blood pressure check as well

## 2023-03-13 NOTE — TELEPHONE ENCOUNTER
I spoke with patient regarding her echo results. She says she was seen in the ER in [facility] recently and he medication was changed. She says the Procardia dose has always been 30mg BID but was changed to daily, what dose do you want her to take. Also she is taking carvedilol 12.5mg daily which is not on her list. Please advise

## 2023-03-14 NOTE — TELEPHONE ENCOUNTER
I will let her know that her blood pressure looks pretty good.  Is she having issues on this current dosing

## 2023-03-15 RX ORDER — NIFEDIPINE 30 MG/1
30 TABLET, EXTENDED RELEASE ORAL DAILY
Qty: 30 TABLET | Refills: 0 | Status: SHIPPED | OUTPATIENT
Start: 2023-03-15

## 2023-03-15 NOTE — TELEPHONE ENCOUNTER
She is not having any issues. She needs to know what dose of procardia she should take. She has been taking it BID for years but it was changed to once daily in the ER.

## 2023-05-19 ENCOUNTER — TELEPHONE (OUTPATIENT)
Dept: CARDIOLOGY | Facility: CLINIC | Age: 53
End: 2023-05-19
Payer: COMMERCIAL

## 2023-05-19 RX ORDER — CARVEDILOL 12.5 MG/1
12.5 TABLET ORAL 2 TIMES DAILY
COMMUNITY

## 2023-05-19 NOTE — TELEPHONE ENCOUNTER
I spoke with Janet Huff and updated pt on recommendations from provider.  Pt verbalized understanding and has no further questions at this time.    Thank you,    Airam Jason, RN  Triage [facility]  05/19/23 11:11 EDT

## 2023-05-19 NOTE — TELEPHONE ENCOUNTER
"Pt called the office to report significant ankle & pedal edema that affects her ability to walk.    She says the edema improves some overnight but is still \"pretty bad\".  She said she wears the compression socks at night but can't tolerate them in the daytime because they make her hot.  She feels that the weather is exacerbating the edema.  This started 5-6 weeks ago.  She also has c/o significant fatigue.  Her BP at home runs 120s/70s, HR 70-90s.  She says that she really wants to come off the nifedipine.  This swelling began about a month after she started this medication.    She's currently taking for her BP:    - Carvedilol 12.5 mg BID  - Nifedipine 30 mg QD  - Losartan 50 mg QD    Thank you,    Airam Jason RN  Triage [facility]  05/19/23 10:26 EDT    "

## 2023-05-19 NOTE — TELEPHONE ENCOUNTER
She can stop nifedipine at this time and see if swelling resolves.  Please have her continue to monitor her BP. If systolic readings >= 140 and/or diastolic >= 90 on a normal basis,we will need to address BP.

## 2023-12-28 ENCOUNTER — TRANSCRIBE ORDERS (OUTPATIENT)
Dept: ADMINISTRATIVE | Facility: HOSPITAL | Age: 53
End: 2023-12-28
Payer: COMMERCIAL

## 2023-12-28 DIAGNOSIS — Z12.31 VISIT FOR SCREENING MAMMOGRAM: Primary | ICD-10-CM

## 2024-01-02 ENCOUNTER — HOSPITAL ENCOUNTER (OUTPATIENT)
Dept: MAMMOGRAPHY | Facility: HOSPITAL | Age: 54
Discharge: HOME | End: 2024-01-02
Admitting: NURSE PRACTITIONER
Payer: COMMERCIAL

## 2024-01-02 DIAGNOSIS — Z12.31 VISIT FOR SCREENING MAMMOGRAM: ICD-10-CM

## 2024-01-02 PROCEDURE — 77067 SCR MAMMO BI INCL CAD: CPT

## 2024-01-02 PROCEDURE — 77063 BREAST TOMOSYNTHESIS BI: CPT

## 2024-01-09 ENCOUNTER — TRANSCRIBE ORDERS (OUTPATIENT)
Dept: ADMINISTRATIVE | Facility: HOSPITAL | Age: 54
End: 2024-01-09
Payer: COMMERCIAL

## 2024-01-09 DIAGNOSIS — R92.8 ABNORMAL FINDING ON MAMMOGRAPHY: Primary | ICD-10-CM

## 2024-01-09 DIAGNOSIS — N63.20 MASS OF LEFT BREAST, UNSPECIFIED QUADRANT: ICD-10-CM

## 2024-01-16 ENCOUNTER — HOSPITAL ENCOUNTER (OUTPATIENT)
Dept: ULTRASOUND IMAGING | Facility: HOSPITAL | Age: 54
Discharge: HOME | End: 2024-01-16
Payer: COMMERCIAL

## 2024-01-16 ENCOUNTER — HOSPITAL ENCOUNTER (OUTPATIENT)
Dept: MAMMOGRAPHY | Facility: HOSPITAL | Age: 54
Discharge: HOME | End: 2024-01-16
Payer: COMMERCIAL

## 2024-01-16 DIAGNOSIS — R92.8 ABNORMAL FINDING ON MAMMOGRAPHY: ICD-10-CM

## 2024-01-16 DIAGNOSIS — N63.20 MASS OF LEFT BREAST, UNSPECIFIED QUADRANT: ICD-10-CM

## 2024-01-16 PROCEDURE — G0279 TOMOSYNTHESIS, MAMMO: HCPCS

## 2024-01-16 PROCEDURE — 77065 DX MAMMO INCL CAD UNI: CPT

## 2024-01-16 PROCEDURE — 76642 ULTRASOUND BREAST LIMITED: CPT

## 2024-10-28 ENCOUNTER — PREP FOR SURGERY (OUTPATIENT)
Dept: OTHER | Facility: HOSPITAL | Age: 54
End: 2024-10-28
Payer: COMMERCIAL

## 2024-10-28 ENCOUNTER — OFFICE VISIT (OUTPATIENT)
Dept: SURGERY | Facility: CLINIC | Age: 54
End: 2024-10-28
Payer: COMMERCIAL

## 2024-10-28 VITALS
SYSTOLIC BLOOD PRESSURE: 134 MMHG | BODY MASS INDEX: 34.91 KG/M2 | HEIGHT: 63 IN | HEART RATE: 77 BPM | WEIGHT: 197 LBS | DIASTOLIC BLOOD PRESSURE: 85 MMHG

## 2024-10-28 DIAGNOSIS — R10.84 GENERALIZED ABDOMINAL PAIN: ICD-10-CM

## 2024-10-28 DIAGNOSIS — R19.7 DIARRHEA, UNSPECIFIED TYPE: Primary | ICD-10-CM

## 2024-10-28 PROCEDURE — 99203 OFFICE O/P NEW LOW 30 MIN: CPT | Performed by: NURSE PRACTITIONER

## 2024-10-28 RX ORDER — FAMCICLOVIR 500 MG/1
3 TABLET ORAL
COMMUNITY

## 2024-10-28 RX ORDER — CYANOCOBALAMIN 1000 UG/ML
INJECTION, SOLUTION INTRAMUSCULAR; SUBCUTANEOUS
COMMUNITY
Start: 2010-06-28

## 2024-10-28 RX ORDER — BUPROPION HYDROCHLORIDE 150 MG/1
150 TABLET ORAL DAILY
COMMUNITY
Start: 2023-04-02

## 2024-10-28 RX ORDER — SODIUM CHLORIDE 9 MG/ML
40 INJECTION, SOLUTION INTRAVENOUS AS NEEDED
OUTPATIENT
Start: 2024-12-30 | End: 2024-12-30

## 2024-10-28 RX ORDER — SODIUM CHLORIDE 0.9 % (FLUSH) 0.9 %
10 SYRINGE (ML) INJECTION AS NEEDED
OUTPATIENT
Start: 2024-10-28

## 2024-10-28 RX ORDER — POLYETHYLENE GLYCOL 3350 17 G/17G
POWDER, FOR SOLUTION ORAL
Qty: 238 PACKET | Refills: 0 | Status: SHIPPED | OUTPATIENT
Start: 2024-10-28 | End: 2024-10-29 | Stop reason: SDUPTHER

## 2024-10-28 RX ORDER — SODIUM CHLORIDE 0.9 % (FLUSH) 0.9 %
3 SYRINGE (ML) INJECTION EVERY 12 HOURS SCHEDULED
OUTPATIENT
Start: 2024-10-28

## 2024-10-28 RX ORDER — FLUTICASONE PROPIONATE 50 MCG
1 SPRAY, SUSPENSION (ML) NASAL DAILY
COMMUNITY
Start: 2024-10-02

## 2024-10-28 RX ORDER — FENOFIBRATE 145 MG/1
145 TABLET, COATED ORAL DAILY
COMMUNITY
Start: 2024-05-28 | End: 2025-05-29

## 2024-10-28 NOTE — PROGRESS NOTES
Chief Complaint: Colonoscopy    Subjective      Colonoscopy consultation       History of Present Illness  Janet Huff is a 54 y.o. female presents to [facility] for colonoscopy consultation.    Patient presents today with complaints of diarrhea and abdominal cramping.  Patient reports that she had a reaction to Ozempic and had to stop it, she was restarted on metformin 2000 mg a day.  Patient believes this is contributing to her diarrhea.  He denies any rectal bleeding denies any family history of colorectal cancer.    Denies LUIS.  Denies any cardiac issues.  Denies taking any GLP-1 receptors.    : Colonoscopy (Madie): sigmoid- hyperplastic.    : anal fissure (Madie):       Objective     Past Medical History:   Diagnosis Date    Anemia     Anxiety     Cholelithiasis     Chronic kidney disease (CKD), active medical management without dialysis, stage 2 (mild)     Colon polyp five years ago    Dr. Ramachandran removed a tiny polyp    Diabetes type 2, controlled     Hyperlipidemia     Hypertension     Hypothyroidism     Obstructive sleep apnea     PONV (postoperative nausea and vomiting)        Past Surgical History:   Procedure Laterality Date     SECTION      CHOLECYSTECTOMY      HYSTERECTOMY      NOSE SURGERY      TONSILLECTOMY      UVULOPALATOPHARYNGOPLASTY         Outpatient Medications Marked as Taking for the 10/28/24 encounter (Office Visit) with Enriqueta López   Medication Sig Dispense Refill    ascorbic acid (VITAMIN C) 250 MG tablet Take 1 tablet by mouth Daily.      buPROPion XL (WELLBUTRIN XL) 150 MG 24 hr tablet Take 1 tablet by mouth Daily.      carvedilol (COREG) 12.5 MG tablet Take 1 tablet by mouth 2 (Two) Times a Day. Pt has been on for years      Cholecalciferol 50 MCG (2000) tablet Take 1 tablet by mouth Daily.      cyanocobalamin 1000 MCG/ML injection       empagliflozin (JARDIANCE) 10 MG tablet tablet Take 1 tablet by mouth Daily.       famciclovir (FAMVIR) 500 MG tablet Take 3 tablets by mouth.      fenofibrate (TRICOR) 145 MG tablet Take 1 tablet by mouth Daily.      fluticasone (FLONASE) 50 MCG/ACT nasal spray 1 spray by Each Nare route Daily.      levothyroxine (SYNTHROID, LEVOTHROID) 112 MCG tablet Take 1 tablet by mouth Every Morning.      loratadine (CLARITIN) 10 MG tablet Take 1 tablet by mouth Daily.      losartan (COZAAR) 50 MG tablet Take 1 tablet by mouth Daily.      metFORMIN (GLUCOPHAGE) 1000 MG tablet Take 1 tablet by mouth 2 (Two) Times a Day With Meals.      vitamin D (ERGOCALCIFEROL) 1.25 MG (04055 UT) capsule capsule Take 1 capsule by mouth 1 (One) Time Per Week.         Allergies   Allergen Reactions    Alpha-Gal Anaphylaxis    Amlodipine Headache    Codeine Other (See Comments)     Turns red and swells    Pravastatin Myalgia    Semaglutide Other (See Comments)     Delayed gastric emptying        Family History   Problem Relation Age of Onset    Hypertension Mother     Heart disease Mother     Sudden death Mother 46        possible MI    Diabetes Mother     Early death Mother     Skin cancer Maternal Grandmother     Cancer Maternal Grandfather     Arthritis Father     COPD Father     Malig Hyperthermia Brother        Social History     Socioeconomic History    Marital status:    Tobacco Use    Smoking status: Never     Passive exposure: Never    Smokeless tobacco: Never   Vaping Use    Vaping status: Never Used   Substance and Sexual Activity    Alcohol use: Never    Drug use: Never    Sexual activity: Not Currently     Partners: Male     Birth control/protection: Post-menopausal, Hysterectomy       Review of Systems   Constitutional:  Negative for chills and fever.   Gastrointestinal:  Positive for abdominal pain and diarrhea. Negative for abdominal distention, anal bleeding, blood in stool, constipation and rectal pain.        Vital Signs:   /85 (BP Location: Left arm, Patient Position: Sitting, Cuff Size:  "Adult)   Pulse 77   Ht 160 cm (63\")   Wt 89.4 kg (197 lb)   BMI 34.90 kg/m²      Physical Exam  Vitals and nursing note reviewed.   Constitutional:       General: She is not in acute distress.     Appearance: Normal appearance. She is not ill-appearing.   HENT:      Head: Normocephalic and atraumatic.   Cardiovascular:      Rate and Rhythm: Normal rate.   Pulmonary:      Effort: Pulmonary effort is normal.      Breath sounds: No stridor.   Abdominal:      Palpations: Abdomen is soft.      Tenderness: There is no guarding.   Musculoskeletal:         General: No deformity. Normal range of motion.   Skin:     General: Skin is warm and dry.      Coloration: Skin is not jaundiced.   Neurological:      General: No focal deficit present.      Mental Status: She is alert and oriented to person, place, and time.   Psychiatric:         Mood and Affect: Mood normal.         Thought Content: Thought content normal.          Result Review :          []  Laboratory  []  Radiology  []  Pathology  []  Microbiology  []  EKG/Telemetry   []  Cardiology/Vascular   []  Old records  I spent 15 minutes caring for Janet on this date of service. This time includes time spent by me in the following activities: reviewing tests, obtaining and/or reviewing a separately obtained history, performing a medically appropriate examination and/or evaluation, ordering medications, tests, or procedures, and documenting information in the medical record        Assessment and Plan    Diagnoses and all orders for this visit:    1. Diarrhea, unspecified type (Primary)    2. Generalized abdominal pain    Other orders  -     Discontinue: polyethylene glycol (MIRALAX) 17 g packet; Take as directed.  Instructions given in office.  Dispense: 238 g bottle  Dispense: 238 packet; Refill: 0  -     polyethylene glycol (MIRALAX) 17 g packet; Take as directed.  Instructions given in office.  Dispense: 238 g bottle  Dispense: 238 packet; Refill: 0        Follow Up "   Return for Schedule colonoscopy with Dr. Ramachandran on 12/30/2024 [facility].    Hospital arrival time: 0800    Possible risks/complications, benefits, and alternatives to surgical or invasive procedures have been explained to patient and/or legal guardian.    Patient has been evaluated and can tolerate anesthesia and/or sedation. Risks, benefits, and alternatives to anesthesia and sedation have been explained to the patient and/or legal guardian. Patient verbalizes understanding and is willing to proceed with the above plan.     Patient was given instructions and counseling regarding her condition or for health maintenance advice. Please see specific information pulled into the AVS if appropriate.      Thank you for allowing me to participate in the care of this patient. Please call with questions or concerns.

## 2024-10-29 RX ORDER — POLYETHYLENE GLYCOL 3350 17 G/17G
POWDER, FOR SOLUTION ORAL
Qty: 238 PACKET | Refills: 0 | Status: SHIPPED | OUTPATIENT
Start: 2024-10-29

## 2024-12-18 ENCOUNTER — TELEPHONE (OUTPATIENT)
Dept: SURGERY | Facility: CLINIC | Age: 54
End: 2024-12-18
Payer: COMMERCIAL

## 2024-12-18 RX ORDER — SODIUM, POTASSIUM,MAG SULFATES 17.5-3.13G
SOLUTION, RECONSTITUTED, ORAL ORAL
Qty: 354 ML | Refills: 0 | Status: SHIPPED | OUTPATIENT
Start: 2024-12-18

## 2024-12-18 NOTE — TELEPHONE ENCOUNTER
PT CALLED TO SEE IF YOU COULD SEND IN SUPREP FOR HER BOWEL PREP. SHE WOULD RATHER DO THAT THAN THE MIRALAX. PHARMACY WAS VERIFIED.

## 2024-12-20 NOTE — PRE-PROCEDURE INSTRUCTIONS
"Instructed on date and arrival time of 0800 Instructed that arrival time is not their procedure time but allows time to prepare for procedure.  Come to entrance \"C\". Must have  over age 18 to drive home.  May have two visitors; however, children under 12 must stay in waiting room.  Discussed clear liquid diet (no red or purple), bowel prep, and NPO.  May take medications as usual except for blood thinners, diabetic medications, and weight loss medications.  Verbalized understanding of instructions given.  Instructed to call for questions or concerns.  "

## 2024-12-27 ENCOUNTER — ANESTHESIA EVENT (OUTPATIENT)
Dept: GASTROENTEROLOGY | Facility: HOSPITAL | Age: 54
End: 2024-12-27
Payer: COMMERCIAL

## 2024-12-27 NOTE — ANESTHESIA PREPROCEDURE EVALUATION
Anesthesia Evaluation     Patient summary reviewed and Nursing notes reviewed   history of anesthetic complications:  PONV  NPO Solid Status: > 8 hours  NPO Liquid Status: > 2 hours           Airway   Mallampati: II  TM distance: >3 FB  No difficulty expected  Dental - normal exam     Pulmonary - normal exam   (+) ,sleep apnea  Cardiovascular - normal exam  Exercise tolerance: good (4-7 METS)    ECG reviewed    (+) hypertension, hyperlipidemia    ROS comment: EKG 2023   NSR    Echo 3/10/23  Interpretation Summary  ·  Left ventricular systolic function is normal. Calculated left ventricular EF = 67.1%  ·  Left ventricular diastolic function was normal.  ·  Estimated right ventricular systolic pressure from tricuspid regurgitation is normal (<35 mmHg). Calculated right ventricular systolic pressure from tricuspid regurgitation is 16 mmHg.         Neuro/Psych  (+) psychiatric history  GI/Hepatic/Renal/Endo    (+) renal disease- CRI, diabetes mellitus, thyroid problem hypothyroidism    Musculoskeletal     (+) back pain  Abdominal    Substance History      OB/GYN          Other        ROS/Med Hx Other: Scope patch and zofran ordered preop.                     Anesthesia Plan    ASA 3     general   total IV anesthesia  (Total IV Anesthesia    Patient understands anesthesia not responsible for dental damage.  )  intravenous induction     Anesthetic plan, risks, benefits, and alternatives have been provided, discussed and informed consent has been obtained with: patient.    Plan discussed with CRNA.        CODE STATUS:

## 2024-12-30 ENCOUNTER — ANESTHESIA (OUTPATIENT)
Dept: GASTROENTEROLOGY | Facility: HOSPITAL | Age: 54
End: 2024-12-30
Payer: COMMERCIAL

## 2024-12-30 ENCOUNTER — HOSPITAL ENCOUNTER (OUTPATIENT)
Facility: HOSPITAL | Age: 54
Setting detail: HOSPITAL OUTPATIENT SURGERY
Discharge: HOME | End: 2024-12-30
Attending: SURGERY | Admitting: SURGERY
Payer: COMMERCIAL

## 2024-12-30 VITALS
SYSTOLIC BLOOD PRESSURE: 139 MMHG | RESPIRATION RATE: 13 BRPM | WEIGHT: 201.72 LBS | BODY MASS INDEX: 35.73 KG/M2 | HEART RATE: 78 BPM | OXYGEN SATURATION: 98 % | TEMPERATURE: 97.9 F | DIASTOLIC BLOOD PRESSURE: 81 MMHG

## 2024-12-30 DIAGNOSIS — R10.84 GENERALIZED ABDOMINAL PAIN: ICD-10-CM

## 2024-12-30 DIAGNOSIS — R19.7 DIARRHEA, UNSPECIFIED TYPE: ICD-10-CM

## 2024-12-30 LAB — GLUCOSE BLDC GLUCOMTR-MCNC: 103 MG/DL (ref 70–99)

## 2024-12-30 PROCEDURE — 25010000002 PROPOFOL 10 MG/ML EMULSION: Performed by: NURSE ANESTHETIST, CERTIFIED REGISTERED

## 2024-12-30 PROCEDURE — 25010000002 LIDOCAINE PF 2% 2 % SOLUTION: Performed by: NURSE ANESTHETIST, CERTIFIED REGISTERED

## 2024-12-30 PROCEDURE — 82948 REAGENT STRIP/BLOOD GLUCOSE: CPT | Performed by: NURSE ANESTHETIST, CERTIFIED REGISTERED

## 2024-12-30 PROCEDURE — 25010000002 ONDANSETRON PER 1 MG

## 2024-12-30 RX ORDER — SODIUM CHLORIDE 0.9 % (FLUSH) 0.9 %
10 SYRINGE (ML) INJECTION AS NEEDED
Status: DISCONTINUED | OUTPATIENT
Start: 2024-12-30 | End: 2024-12-30 | Stop reason: HOSPADM

## 2024-12-30 RX ORDER — SCOLOPAMINE TRANSDERMAL SYSTEM 1 MG/1
1 PATCH, EXTENDED RELEASE TRANSDERMAL
Status: DISCONTINUED | OUTPATIENT
Start: 2024-12-30 | End: 2024-12-30 | Stop reason: HOSPADM

## 2024-12-30 RX ORDER — LIDOCAINE HYDROCHLORIDE 20 MG/ML
INJECTION, SOLUTION EPIDURAL; INFILTRATION; INTRACAUDAL; PERINEURAL AS NEEDED
Status: DISCONTINUED | OUTPATIENT
Start: 2024-12-30 | End: 2024-12-30 | Stop reason: SURG

## 2024-12-30 RX ORDER — PROPOFOL 10 MG/ML
VIAL (ML) INTRAVENOUS AS NEEDED
Status: DISCONTINUED | OUTPATIENT
Start: 2024-12-30 | End: 2024-12-30 | Stop reason: SURG

## 2024-12-30 RX ORDER — ONDANSETRON 2 MG/ML
4 INJECTION INTRAMUSCULAR; INTRAVENOUS ONCE AS NEEDED
Status: DISCONTINUED | OUTPATIENT
Start: 2024-12-30 | End: 2024-12-30 | Stop reason: HOSPADM

## 2024-12-30 RX ORDER — SODIUM CHLORIDE 0.9 % (FLUSH) 0.9 %
3 SYRINGE (ML) INJECTION EVERY 12 HOURS SCHEDULED
Status: DISCONTINUED | OUTPATIENT
Start: 2024-12-30 | End: 2024-12-30 | Stop reason: HOSPADM

## 2024-12-30 RX ORDER — SODIUM CHLORIDE, SODIUM LACTATE, POTASSIUM CHLORIDE, CALCIUM CHLORIDE 600; 310; 30; 20 MG/100ML; MG/100ML; MG/100ML; MG/100ML
30 INJECTION, SOLUTION INTRAVENOUS CONTINUOUS
Status: DISCONTINUED | OUTPATIENT
Start: 2024-12-30 | End: 2024-12-30 | Stop reason: HOSPADM

## 2024-12-30 RX ORDER — SODIUM CHLORIDE 9 MG/ML
40 INJECTION, SOLUTION INTRAVENOUS AS NEEDED
Status: DISCONTINUED | OUTPATIENT
Start: 2024-12-30 | End: 2024-12-30 | Stop reason: HOSPADM

## 2024-12-30 RX ORDER — ONDANSETRON 4 MG/1
4 TABLET, ORALLY DISINTEGRATING ORAL ONCE AS NEEDED
Status: DISCONTINUED | OUTPATIENT
Start: 2024-12-30 | End: 2024-12-30 | Stop reason: HOSPADM

## 2024-12-30 RX ORDER — ONDANSETRON 2 MG/ML
4 INJECTION INTRAMUSCULAR; INTRAVENOUS ONCE
Status: COMPLETED | OUTPATIENT
Start: 2024-12-30 | End: 2024-12-30

## 2024-12-30 RX ADMIN — PROPOFOL 50 MG: 10 INJECTION, EMULSION INTRAVENOUS at 09:55

## 2024-12-30 RX ADMIN — PROPOFOL 175 MCG/KG/MIN: 10 INJECTION, EMULSION INTRAVENOUS at 09:49

## 2024-12-30 RX ADMIN — LIDOCAINE HYDROCHLORIDE 50 MG: 20 INJECTION, SOLUTION EPIDURAL; INFILTRATION; INTRACAUDAL; PERINEURAL at 09:49

## 2024-12-30 RX ADMIN — ONDANSETRON 4 MG: 2 INJECTION INTRAMUSCULAR; INTRAVENOUS at 09:36

## 2024-12-30 RX ADMIN — SCOPALAMINE 1 PATCH: 1 PATCH, EXTENDED RELEASE TRANSDERMAL at 09:38

## 2024-12-30 RX ADMIN — PROPOFOL 100 MG: 10 INJECTION, EMULSION INTRAVENOUS at 09:49

## 2024-12-30 NOTE — ANESTHESIA POSTPROCEDURE EVALUATION
Patient: Janet Huff    Procedure Summary       Date: 12/30/24 Room / Location: [facility] ENDOSCOPY 3 / [facility] ENDOSCOPY    Anesthesia Start: 0946 Anesthesia Stop: 1017    Procedure: COLONOSCOPY Diagnosis:       Diarrhea, unspecified type      Generalized abdominal pain      (Diarrhea, unspecified type [R19.7])      (Generalized abdominal pain [R10.84])    Surgeons: Aristeo Ramachandran MD Provider: Ben Parnell CRNA    Anesthesia Type: general ASA Status: 3            Anesthesia Type: general    Vitals  Vitals Value Taken Time   /81 12/30/24 1030   Temp 36.6 °C (97.9 °F) 12/30/24 1030   Pulse 78 12/30/24 1030   Resp 13 12/30/24 1030   SpO2 98 % 12/30/24 1030           Post Anesthesia Care and Evaluation    Patient location during evaluation: bedside  Patient participation: complete - patient participated  Level of consciousness: awake  Pain management: adequate    Airway patency: patent  Anesthetic complications: No anesthetic complications  PONV Status: controlled  Cardiovascular status: acceptable and stable  Respiratory status: acceptable

## 2024-12-30 NOTE — H&P
[facility]   HISTORY AND PHYSICAL    Patient Name: Janet Huff  : 1970  MRN: [id number]  Primary Care Physician:  Demetria Crane APRN  Date of admission: 2024    Subjective   Subjective     Chief Complaint: Diarrhea, abdominal pain    HPI:    Janet Huff is a 54 y.o. female who presents complaining of diarrhea and abdominal pain.    Review of Systems   Respiratory:  Negative for shortness of breath.    Cardiovascular:  Negative for chest pain.       Personal History     Past Medical History:   Diagnosis Date    Anemia     Anxiety     Cholelithiasis     Chronic kidney disease (CKD), active medical management without dialysis, stage 2 (mild)     Colon polyp five years ago    Dr. Ramachandran removed a tiny polyp    Diabetes type 2, controlled     Hyperlipidemia     Hypertension     Hypothyroidism     Obstructive sleep apnea     PONV (postoperative nausea and vomiting)        Past Surgical History:   Procedure Laterality Date     SECTION      CHOLECYSTECTOMY      HYSTERECTOMY      NOSE SURGERY      TONSILLECTOMY      UVULOPALATOPHARYNGOPLASTY         Family History: family history includes Arthritis in her father; COPD in her father; Cancer in her maternal grandfather; Diabetes in her mother; Early death in her mother; Heart disease in her mother; Hypertension in her mother; Malig Hyperthermia in her brother; Skin cancer in her maternal grandmother; Sudden death (age of onset: 46) in her mother. Otherwise pertinent FHx was reviewed and not pertinent to current issue.    Social History:  reports that she has never smoked. She has never been exposed to tobacco smoke. She has never used smokeless tobacco. She reports that she does not drink alcohol and does not use drugs.    Home Medications:  Cholecalciferol, ascorbic acid, buPROPion XL, carvedilol, cyanocobalamin, empagliflozin, famciclovir, fenofibrate, fluticasone, levothyroxine, loratadine, losartan, metFORMIN, polyethylene glycol,  sodium-potassium-magnesium sulfates, and vitamin D    Allergies:  Allergies   Allergen Reactions    Alpha-Gal Anaphylaxis    Amlodipine Headache    Codeine Other (See Comments)     Turns red and swells    Pravastatin Myalgia    Semaglutide Other (See Comments)     Delayed gastric emptying       Objective    Objective     Vitals:        Physical Exam  HENT:      Head: Normocephalic.   Cardiovascular:      Rate and Rhythm: Normal rate.   Pulmonary:      Effort: Pulmonary effort is normal.   Abdominal:      Palpations: Abdomen is soft.   Musculoskeletal:         General: Normal range of motion.      Cervical back: Normal range of motion.   Skin:     General: Skin is warm.   Neurological:      General: No focal deficit present.      Mental Status: She is alert.   Psychiatric:         Mood and Affect: Mood normal.         Result Review    Result Review:  I have personally reviewed the results from the time of this admission to 12/30/2024 09:13 EST and agree with these findings:  []  Laboratory  []  Microbiology  []  Radiology  []  EKG/Telemetry   []  Cardiology/Vascular   []  Pathology  []  Old records  []  Other:  Most notable findings include:     Assessment & Plan   Assessment / Plan     Brief Patient Summary:  Janet Huff is a 54 y.o. female who presents complaining of diarrhea and abdominal pain.    Active Hospital Problems:  Active Hospital Problems    Diagnosis     Diarrhea     Generalized abdominal pain        Plan:   We will proceed with a colonoscopy.  Risk benefits and alternatives were explained.    VTE Prophylaxis:  No VTE prophylaxis order currently exists.        CODE STATUS:         Admission Status:  I believe this patient meets outpatient status.    Electronically signed by Aristeo Ramachandran MD, 12/30/24, 9:13 AM EST.

## 2025-01-07 ENCOUNTER — TELEPHONE (OUTPATIENT)
Dept: SURGERY | Facility: CLINIC | Age: 55
End: 2025-01-07
Payer: COMMERCIAL

## (undated) DEVICE — STERILE POLYISOPRENE POWDER-FREE SURGICAL GLOVES: Brand: PROTEXIS

## (undated) DEVICE — CONN JET HYDRA H20 AUXILIARY DISP

## (undated) DEVICE — Device

## (undated) DEVICE — SOL IRR NACL 0.9PCT BO 1000ML

## (undated) DEVICE — GOWN,REINFRCE,POLY,SIRUS,BREATH SLV,XXLG: Brand: MEDLINE

## (undated) DEVICE — Device: Brand: DEFENDO AIR/WATER/SUCTION AND BIOPSY VALVE

## (undated) DEVICE — SOL IRRG H2O PL/BG 1000ML STRL

## (undated) DEVICE — SOLIDIFIER LIQLOC PLS 1500CC BT

## (undated) DEVICE — LINER SURG CANSTR SXN S/RIGD 1500CC